# Patient Record
Sex: FEMALE | Race: WHITE | Employment: UNEMPLOYED | URBAN - METROPOLITAN AREA
[De-identification: names, ages, dates, MRNs, and addresses within clinical notes are randomized per-mention and may not be internally consistent; named-entity substitution may affect disease eponyms.]

---

## 2021-04-16 ENCOUNTER — OFFICE VISIT (OUTPATIENT)
Dept: OBGYN CLINIC | Age: 41
End: 2021-04-16
Payer: COMMERCIAL

## 2021-04-16 VITALS
WEIGHT: 229 LBS | BODY MASS INDEX: 38.15 KG/M2 | HEART RATE: 90 BPM | HEIGHT: 65 IN | SYSTOLIC BLOOD PRESSURE: 120 MMHG | DIASTOLIC BLOOD PRESSURE: 80 MMHG

## 2021-04-16 DIAGNOSIS — Z12.31 ENCOUNTER FOR MAMMOGRAM TO ESTABLISH BASELINE MAMMOGRAM: ICD-10-CM

## 2021-04-16 DIAGNOSIS — Z01.419 WOMEN'S ANNUAL ROUTINE GYNECOLOGICAL EXAMINATION: Primary | ICD-10-CM

## 2021-04-16 DIAGNOSIS — Z11.51 ENCOUNTER FOR SCREENING FOR HUMAN PAPILLOMAVIRUS (HPV): ICD-10-CM

## 2021-04-16 DIAGNOSIS — N93.8 DUB (DYSFUNCTIONAL UTERINE BLEEDING): ICD-10-CM

## 2021-04-16 PROCEDURE — G8417 CALC BMI ABV UP PARAM F/U: HCPCS | Performed by: OBSTETRICS & GYNECOLOGY

## 2021-04-16 PROCEDURE — G8427 DOCREV CUR MEDS BY ELIG CLIN: HCPCS | Performed by: OBSTETRICS & GYNECOLOGY

## 2021-04-16 PROCEDURE — 99386 PREV VISIT NEW AGE 40-64: CPT | Performed by: OBSTETRICS & GYNECOLOGY

## 2021-04-16 PROCEDURE — 4004F PT TOBACCO SCREEN RCVD TLK: CPT | Performed by: OBSTETRICS & GYNECOLOGY

## 2021-04-16 RX ORDER — OMEPRAZOLE 40 MG/1
CAPSULE, DELAYED RELEASE ORAL
COMMUNITY
Start: 2021-04-01

## 2021-04-16 NOTE — PROGRESS NOTES
Subjective:       Monroe is a 38 y.o. female  here for routine exam.  Current Complaints: no breast pain or new or enlarging lumps on self exam, no vaginal bleeding, no discharge or pelvic pain, no hot flashes, she complains of irregular bleeding for the last 6mos having monthly to twice monthly 7 day cycles. Menstrual history:   See HPI  Sexual activity:  yes, denies knowledge of risky exposure  Abnormalvaginal discharge:  No  Contraceptive method:  none    Vitals:  /80 (Site: Right Upper Arm, Position: Sitting, Cuff Size: Large Adult)   Pulse 90   Ht 5' 5\" (1.651 m)   Wt 229 lb (103.9 kg)   LMP 04/10/2021 (Exact Date)   BMI 38.11 kg/m²   Allergies:Tylenol [acetaminophen]  No past medical history on file. No past surgical history on file. No family history on file.   Social History     Socioeconomic History    Marital status:      Spouse name: Not on file    Number of children: Not on file    Years of education: Not on file    Highest education level: Not on file   Occupational History    Not on file   Social Needs    Financial resource strain: Not on file    Food insecurity     Worry: Not on file     Inability: Not on file    Transportation needs     Medical: Not on file     Non-medical: Not on file   Tobacco Use    Smoking status: Current Every Day Smoker     Packs/day: 1.00     Types: Cigarettes   Substance and Sexual Activity    Alcohol use: No    Drug use: No    Sexual activity: Yes   Lifestyle    Physical activity     Days per week: Not on file     Minutes per session: Not on file    Stress: Not on file   Relationships    Social connections     Talks on phone: Not on file     Gets together: Not on file     Attends [employer] service: Not on file     Active member of club or organization: Not on file     Attends meetings of clubs or organizations: Not on file     Relationship status: Not on file    Intimate partner violence     Fear of current or ex partner: Not Right upper body: No supraclavicular or axillary adenopathy. Left upper body: No supraclavicular or axillary adenopathy. Skin:     General: Skin is warm and dry. Neurological:      Mental Status: She is alert and oriented to person, place, and time. Cranial Nerves: No cranial nerve deficit. Deep Tendon Reflexes: Reflexes normal.   Psychiatric:         Behavior: Behavior normal.         Judgment: Judgment normal.         Assessment:      Diagnosis Orders   1. Women's annual routine gynecological examination  PAP SMEAR   2. Encounter for screening for human papillomavirus (HPV)  PAP SMEAR   3. Encounter for mammogram to establish baseline mammogram  PAP SMEAR    GINNY DIGITAL SCREEN W OR WO CAD BILATERAL   4. DUB (dysfunctional uterine bleeding)  US PELVIS COMPLETE    US NON OB TRANSVAGINAL       Body mass index is 38.11 kg/m². Obesity:  Overweight        Plan:   Pap smear : indicated:  performed. Breast exam :Normal  STD work up : As appropriate    Obesity Counseling:  Given  Smoking Counseling:  N/A  STD counseling: Will call pt with results    Orders Placed This Encounter   Procedures    GINNY DIGITAL SCREEN W OR WO CAD BILATERAL     Standing Status:   Future     Standing Expiration Date:   6/16/2022    US PELVIS COMPLETE     Standing Status:   Future     Standing Expiration Date:   4/16/2022    US NON OB TRANSVAGINAL     Standing Status:   Future     Standing Expiration Date:   4/16/2022    PAP SMEAR     Standing Status:   Future     Standing Expiration Date:   4/16/2022     Order Specific Question:   Collection Type     Answer: Thin Prep     Order Specific Question:   Prior Abnormal Pap Test     Answer:   No     Order Specific Question:   Screening or Diagnostic     Answer:   Screening     Order Specific Question:   HPV Requested?      Answer:   Yes     Comments:   16/18     Order Specific Question:   High Risk Patient     Answer:   N/A     No orders of the defined types were placed in this encounter. Follow up:  Return for follow up results.       [address] Levine, Susan. \[facility].\"", DO

## 2021-04-17 ASSESSMENT — ENCOUNTER SYMPTOMS
BLOOD IN STOOL: 0
ABDOMINAL DISTENTION: 0
ABDOMINAL PAIN: 0
SHORTNESS OF BREATH: 0
VOMITING: 0
NAUSEA: 0
DIARRHEA: 0
CONSTIPATION: 0
WHEEZING: 0
COUGH: 0
SORE THROAT: 0

## 2021-04-23 RX ORDER — METRONIDAZOLE 500 MG/1
500 TABLET ORAL 2 TIMES DAILY
Qty: 14 TABLET | Refills: 0 | Status: SHIPPED | OUTPATIENT
Start: 2021-04-23 | End: 2021-04-30

## 2021-04-28 ENCOUNTER — HOSPITAL ENCOUNTER (OUTPATIENT)
Dept: ULTRASOUND IMAGING | Age: 41
Discharge: HOME | End: 2021-04-30
Payer: COMMERCIAL

## 2021-04-28 ENCOUNTER — HOSPITAL ENCOUNTER (OUTPATIENT)
Dept: WOMENS IMAGING | Age: 41
Discharge: HOME | End: 2021-04-30
Payer: COMMERCIAL

## 2021-04-28 DIAGNOSIS — N93.8 DUB (DYSFUNCTIONAL UTERINE BLEEDING): ICD-10-CM

## 2021-04-28 DIAGNOSIS — Z12.31 ENCOUNTER FOR MAMMOGRAM TO ESTABLISH BASELINE MAMMOGRAM: ICD-10-CM

## 2021-04-28 PROCEDURE — 76856 US EXAM PELVIC COMPLETE: CPT

## 2021-04-28 PROCEDURE — 77063 BREAST TOMOSYNTHESIS BI: CPT

## 2021-04-28 PROCEDURE — 76830 TRANSVAGINAL US NON-OB: CPT

## 2021-05-05 ENCOUNTER — OFFICE VISIT (OUTPATIENT)
Dept: OBGYN CLINIC | Age: 41
End: 2021-05-05
Payer: COMMERCIAL

## 2021-05-05 VITALS
WEIGHT: 231 LBS | BODY MASS INDEX: 38.44 KG/M2 | DIASTOLIC BLOOD PRESSURE: 64 MMHG | SYSTOLIC BLOOD PRESSURE: 122 MMHG | HEART RATE: 92 BPM

## 2021-05-05 DIAGNOSIS — N93.8 DUB (DYSFUNCTIONAL UTERINE BLEEDING): ICD-10-CM

## 2021-05-05 DIAGNOSIS — N93.8 DUB (DYSFUNCTIONAL UTERINE BLEEDING): Primary | ICD-10-CM

## 2021-05-05 PROCEDURE — 58100 BIOPSY OF UTERUS LINING: CPT | Performed by: OBSTETRICS & GYNECOLOGY

## 2023-04-18 DIAGNOSIS — R10.11 RIGHT UPPER QUADRANT PAIN: ICD-10-CM

## 2023-04-18 RX ORDER — OMEPRAZOLE 40 MG/1
CAPSULE, DELAYED RELEASE ORAL
Qty: 90 CAPSULE | Refills: 0 | Status: SHIPPED | OUTPATIENT
Start: 2023-04-18 | End: 2023-06-26

## 2023-05-19 DIAGNOSIS — F32.A DEPRESSION, UNSPECIFIED: ICD-10-CM

## 2023-05-19 RX ORDER — SERTRALINE HYDROCHLORIDE 25 MG/1
TABLET, FILM COATED ORAL
Qty: 30 TABLET | Refills: 1 | Status: SHIPPED | OUTPATIENT
Start: 2023-05-19 | End: 2023-06-14

## 2023-06-12 DIAGNOSIS — E78.5 HYPERLIPIDEMIA, UNSPECIFIED: ICD-10-CM

## 2023-06-12 RX ORDER — ATORVASTATIN CALCIUM 10 MG/1
TABLET, FILM COATED ORAL
Qty: 90 TABLET | Refills: 0 | Status: SHIPPED | OUTPATIENT
Start: 2023-06-12 | End: 2023-07-24 | Stop reason: SDUPTHER

## 2023-06-14 DIAGNOSIS — F32.A DEPRESSION, UNSPECIFIED: ICD-10-CM

## 2023-06-14 RX ORDER — SERTRALINE HYDROCHLORIDE 25 MG/1
TABLET, FILM COATED ORAL
Qty: 30 TABLET | Refills: 1 | Status: SHIPPED | OUTPATIENT
Start: 2023-06-14 | End: 2023-07-24 | Stop reason: SDUPTHER

## 2023-07-24 ENCOUNTER — OFFICE VISIT (OUTPATIENT)
Dept: PRIMARY CARE | Facility: CLINIC | Age: 43
End: 2023-07-24
Payer: COMMERCIAL

## 2023-07-24 VITALS
BODY MASS INDEX: 34.46 KG/M2 | TEMPERATURE: 98.4 F | SYSTOLIC BLOOD PRESSURE: 104 MMHG | DIASTOLIC BLOOD PRESSURE: 70 MMHG | WEIGHT: 220 LBS

## 2023-07-24 DIAGNOSIS — Z12.31 SCREENING MAMMOGRAM FOR BREAST CANCER: ICD-10-CM

## 2023-07-24 DIAGNOSIS — E78.5 HYPERLIPIDEMIA, UNSPECIFIED HYPERLIPIDEMIA TYPE: ICD-10-CM

## 2023-07-24 DIAGNOSIS — E78.5 HYPERLIPIDEMIA, UNSPECIFIED: ICD-10-CM

## 2023-07-24 DIAGNOSIS — R10.11 RIGHT UPPER QUADRANT PAIN: ICD-10-CM

## 2023-07-24 DIAGNOSIS — F32.A DEPRESSION, UNSPECIFIED: ICD-10-CM

## 2023-07-24 DIAGNOSIS — K21.9 GASTROESOPHAGEAL REFLUX DISEASE, UNSPECIFIED WHETHER ESOPHAGITIS PRESENT: ICD-10-CM

## 2023-07-24 DIAGNOSIS — F33.42 RECURRENT MAJOR DEPRESSIVE DISORDER, IN FULL REMISSION (CMS-HCC): Primary | ICD-10-CM

## 2023-07-24 LAB
ALANINE AMINOTRANSFERASE (SGPT) (U/L) IN SER/PLAS: 9 U/L (ref 7–45)
ALBUMIN (G/DL) IN SER/PLAS: 4.1 G/DL (ref 3.4–5)
ALKALINE PHOSPHATASE (U/L) IN SER/PLAS: 100 U/L (ref 33–110)
ANION GAP IN SER/PLAS: 12 MMOL/L (ref 10–20)
ASPARTATE AMINOTRANSFERASE (SGOT) (U/L) IN SER/PLAS: 12 U/L (ref 9–39)
BASOPHILS (10*3/UL) IN BLOOD BY AUTOMATED COUNT: 0.03 X10E9/L (ref 0–0.1)
BASOPHILS/100 LEUKOCYTES IN BLOOD BY AUTOMATED COUNT: 0.4 % (ref 0–2)
BILIRUBIN TOTAL (MG/DL) IN SER/PLAS: 0.2 MG/DL (ref 0–1.2)
CALCIUM (MG/DL) IN SER/PLAS: 9.3 MG/DL (ref 8.6–10.3)
CARBON DIOXIDE, TOTAL (MMOL/L) IN SER/PLAS: 25 MMOL/L (ref 21–32)
CHLORIDE (MMOL/L) IN SER/PLAS: 103 MMOL/L (ref 98–107)
CHOLESTEROL (MG/DL) IN SER/PLAS: 166 MG/DL (ref 0–199)
CHOLESTEROL IN HDL (MG/DL) IN SER/PLAS: 46.1 MG/DL
CHOLESTEROL/HDL RATIO: 3.6
CREATININE (MG/DL) IN SER/PLAS: 0.75 MG/DL (ref 0.5–1.05)
EOSINOPHILS (10*3/UL) IN BLOOD BY AUTOMATED COUNT: 0.09 X10E9/L (ref 0–0.7)
EOSINOPHILS/100 LEUKOCYTES IN BLOOD BY AUTOMATED COUNT: 1.1 % (ref 0–6)
ERYTHROCYTE DISTRIBUTION WIDTH (RATIO) BY AUTOMATED COUNT: 14.4 % (ref 11.5–14.5)
ERYTHROCYTE MEAN CORPUSCULAR HEMOGLOBIN CONCENTRATION (G/DL) BY AUTOMATED: 31.5 G/DL (ref 32–36)
ERYTHROCYTE MEAN CORPUSCULAR VOLUME (FL) BY AUTOMATED COUNT: 89 FL (ref 80–100)
ERYTHROCYTES (10*6/UL) IN BLOOD BY AUTOMATED COUNT: 4.8 X10E12/L (ref 4–5.2)
GFR FEMALE: >90 ML/MIN/1.73M2
GLUCOSE (MG/DL) IN SER/PLAS: 101 MG/DL (ref 74–99)
HEMATOCRIT (%) IN BLOOD BY AUTOMATED COUNT: 42.9 % (ref 36–46)
HEMOGLOBIN (G/DL) IN BLOOD: 13.5 G/DL (ref 12–16)
IMMATURE GRANULOCYTES/100 LEUKOCYTES IN BLOOD BY AUTOMATED COUNT: 0.1 % (ref 0–0.9)
LDL: 87 MG/DL (ref 0–99)
LEUKOCYTES (10*3/UL) IN BLOOD BY AUTOMATED COUNT: 8.4 X10E9/L (ref 4.4–11.3)
LYMPHOCYTES (10*3/UL) IN BLOOD BY AUTOMATED COUNT: 2.58 X10E9/L (ref 1.2–4.8)
LYMPHOCYTES/100 LEUKOCYTES IN BLOOD BY AUTOMATED COUNT: 30.8 % (ref 13–44)
MONOCYTES (10*3/UL) IN BLOOD BY AUTOMATED COUNT: 0.59 X10E9/L (ref 0.1–1)
MONOCYTES/100 LEUKOCYTES IN BLOOD BY AUTOMATED COUNT: 7 % (ref 2–10)
NEUTROPHILS (10*3/UL) IN BLOOD BY AUTOMATED COUNT: 5.08 X10E9/L (ref 1.2–7.7)
NEUTROPHILS/100 LEUKOCYTES IN BLOOD BY AUTOMATED COUNT: 60.6 % (ref 40–80)
PLATELETS (10*3/UL) IN BLOOD AUTOMATED COUNT: 308 X10E9/L (ref 150–450)
POTASSIUM (MMOL/L) IN SER/PLAS: 4.5 MMOL/L (ref 3.5–5.3)
PROTEIN TOTAL: 7.1 G/DL (ref 6.4–8.2)
SODIUM (MMOL/L) IN SER/PLAS: 135 MMOL/L (ref 136–145)
THYROTROPIN (MIU/L) IN SER/PLAS BY DETECTION LIMIT <= 0.05 MIU/L: 3.14 MIU/L (ref 0.44–3.98)
TRIGLYCERIDE (MG/DL) IN SER/PLAS: 166 MG/DL (ref 0–149)
UREA NITROGEN (MG/DL) IN SER/PLAS: 11 MG/DL (ref 6–23)
VLDL: 33 MG/DL (ref 0–40)

## 2023-07-24 PROCEDURE — 99214 OFFICE O/P EST MOD 30 MIN: CPT | Performed by: FAMILY MEDICINE

## 2023-07-24 PROCEDURE — 85025 COMPLETE CBC W/AUTO DIFF WBC: CPT

## 2023-07-24 PROCEDURE — 84443 ASSAY THYROID STIM HORMONE: CPT

## 2023-07-24 PROCEDURE — 1036F TOBACCO NON-USER: CPT | Performed by: FAMILY MEDICINE

## 2023-07-24 PROCEDURE — 80061 LIPID PANEL: CPT

## 2023-07-24 PROCEDURE — 80053 COMPREHEN METABOLIC PANEL: CPT

## 2023-07-24 PROCEDURE — 83036 HEMOGLOBIN GLYCOSYLATED A1C: CPT

## 2023-07-24 RX ORDER — ATORVASTATIN CALCIUM 10 MG/1
10 TABLET, FILM COATED ORAL DAILY
Qty: 90 TABLET | Refills: 1 | Status: SHIPPED | OUTPATIENT
Start: 2023-07-24 | End: 2024-02-27

## 2023-07-24 RX ORDER — OMEPRAZOLE 40 MG/1
40 CAPSULE, DELAYED RELEASE ORAL DAILY
Qty: 90 CAPSULE | Refills: 1 | Status: SHIPPED | OUTPATIENT
Start: 2023-07-24 | End: 2024-01-22

## 2023-07-24 RX ORDER — SERTRALINE HYDROCHLORIDE 25 MG/1
25 TABLET, FILM COATED ORAL DAILY
Qty: 90 TABLET | Refills: 1 | Status: SHIPPED | OUTPATIENT
Start: 2023-07-24 | End: 2024-02-27

## 2023-07-24 ASSESSMENT — ENCOUNTER SYMPTOMS
COUGH: 0
HEADACHES: 0
HEMATURIA: 0
DYSPHORIC MOOD: 0
SLEEP DISTURBANCE: 0
FEVER: 0
FATIGUE: 0
DYSURIA: 0
PALPITATIONS: 0
ABDOMINAL PAIN: 0
BACK PAIN: 0
FREQUENCY: 0
RHINORRHEA: 0
NAUSEA: 0
ADENOPATHY: 0
VOICE CHANGE: 0
WEAKNESS: 0
ROS SKIN COMMENTS: NO MOLES GROWING OR CHANGING.
DEPRESSION: 0
NERVOUS/ANXIOUS: 0
VOMITING: 0
MYALGIAS: 0
SORE THROAT: 0
CONSTIPATION: 0
BLOOD IN STOOL: 0
DIZZINESS: 0
SHORTNESS OF BREATH: 0
WHEEZING: 0
DIARRHEA: 0
SINUS PRESSURE: 0
ARTHRALGIAS: 0
WOUND: 0
NUMBNESS: 0

## 2023-07-24 ASSESSMENT — PATIENT HEALTH QUESTIONNAIRE - PHQ9
2. FEELING DOWN, DEPRESSED OR HOPELESS: NOT AT ALL
SUM OF ALL RESPONSES TO PHQ9 QUESTIONS 1 AND 2: 0
1. LITTLE INTEREST OR PLEASURE IN DOING THINGS: NOT AT ALL

## 2023-07-24 NOTE — PATIENT INSTRUCTIONS
I will refill your medication and order labs.  I ordered a screening mammogram.  It is excellent that you stopped smoking in 2021.  Do not go back to smoking.  Weight loss would benefit your overall health.  Return in six months for a recheck.

## 2023-07-25 LAB
ESTIMATED AVERAGE GLUCOSE FOR HBA1C: 114 MG/DL
HEMOGLOBIN A1C/HEMOGLOBIN TOTAL IN BLOOD: 5.6 %

## 2023-07-27 ENCOUNTER — TELEPHONE (OUTPATIENT)
Dept: PRIMARY CARE | Facility: CLINIC | Age: 43
End: 2023-07-27
Payer: COMMERCIAL

## 2023-07-27 NOTE — TELEPHONE ENCOUNTER
----- Message from Rasheed Gtz DO sent at 7/26/2023  3:48 PM EDT -----  Regarding: FW:   Please let her know that her blood work showed no significant abnormalities.  ----- Message -----  From: Lab, Background User  Sent: 7/24/2023   5:43 PM EDT  To: Rasheed Gtz DO

## 2024-01-22 DIAGNOSIS — R10.11 RIGHT UPPER QUADRANT PAIN: ICD-10-CM

## 2024-01-22 RX ORDER — OMEPRAZOLE 40 MG/1
40 CAPSULE, DELAYED RELEASE ORAL DAILY
Qty: 90 CAPSULE | Refills: 1 | Status: SHIPPED | OUTPATIENT
Start: 2024-01-22

## 2024-02-27 DIAGNOSIS — F32.A DEPRESSION, UNSPECIFIED: ICD-10-CM

## 2024-02-27 DIAGNOSIS — E78.5 HYPERLIPIDEMIA, UNSPECIFIED: ICD-10-CM

## 2024-02-27 RX ORDER — SERTRALINE HYDROCHLORIDE 25 MG/1
25 TABLET, FILM COATED ORAL DAILY
Qty: 90 TABLET | Refills: 0 | Status: SHIPPED | OUTPATIENT
Start: 2024-02-27

## 2024-02-27 RX ORDER — ATORVASTATIN CALCIUM 10 MG/1
10 TABLET, FILM COATED ORAL DAILY
Qty: 90 TABLET | Refills: 0 | Status: SHIPPED | OUTPATIENT
Start: 2024-02-27

## 2024-07-03 DIAGNOSIS — F32.A DEPRESSION, UNSPECIFIED: ICD-10-CM

## 2024-07-03 DIAGNOSIS — E78.5 HYPERLIPIDEMIA, UNSPECIFIED: ICD-10-CM

## 2024-07-05 RX ORDER — SERTRALINE HYDROCHLORIDE 25 MG/1
25 TABLET, FILM COATED ORAL DAILY
Qty: 30 TABLET | Refills: 1 | Status: SHIPPED | OUTPATIENT
Start: 2024-07-05

## 2024-07-05 RX ORDER — ATORVASTATIN CALCIUM 10 MG/1
10 TABLET, FILM COATED ORAL DAILY
Qty: 90 TABLET | Refills: 0 | Status: SHIPPED | OUTPATIENT
Start: 2024-07-05

## 2024-08-29 ENCOUNTER — APPOINTMENT (OUTPATIENT)
Dept: PRIMARY CARE | Facility: CLINIC | Age: 44
End: 2024-08-29
Payer: COMMERCIAL

## 2024-08-29 VITALS
DIASTOLIC BLOOD PRESSURE: 66 MMHG | HEIGHT: 67 IN | SYSTOLIC BLOOD PRESSURE: 110 MMHG | TEMPERATURE: 97.6 F | WEIGHT: 227 LBS | BODY MASS INDEX: 35.63 KG/M2

## 2024-08-29 DIAGNOSIS — H66.001 ACUTE SUPPURATIVE OTITIS MEDIA OF RIGHT EAR WITHOUT SPONTANEOUS RUPTURE OF TYMPANIC MEMBRANE, RECURRENCE NOT SPECIFIED: ICD-10-CM

## 2024-08-29 DIAGNOSIS — Z00.00 GENERAL MEDICAL EXAM: Primary | ICD-10-CM

## 2024-08-29 DIAGNOSIS — E78.5 HYPERLIPIDEMIA, UNSPECIFIED HYPERLIPIDEMIA TYPE: ICD-10-CM

## 2024-08-29 DIAGNOSIS — Z12.31 SCREENING MAMMOGRAM FOR BREAST CANCER: ICD-10-CM

## 2024-08-29 DIAGNOSIS — Z23 NEED FOR VACCINATION: ICD-10-CM

## 2024-08-29 PROCEDURE — 90471 IMMUNIZATION ADMIN: CPT | Performed by: FAMILY MEDICINE

## 2024-08-29 PROCEDURE — 3008F BODY MASS INDEX DOCD: CPT | Performed by: FAMILY MEDICINE

## 2024-08-29 PROCEDURE — 90656 IIV3 VACC NO PRSV 0.5 ML IM: CPT | Performed by: FAMILY MEDICINE

## 2024-08-29 PROCEDURE — 99396 PREV VISIT EST AGE 40-64: CPT | Performed by: FAMILY MEDICINE

## 2024-08-29 PROCEDURE — 1036F TOBACCO NON-USER: CPT | Performed by: FAMILY MEDICINE

## 2024-08-29 RX ORDER — AMOXICILLIN AND CLAVULANATE POTASSIUM 875; 125 MG/1; MG/1
875 TABLET, FILM COATED ORAL 2 TIMES DAILY
Qty: 20 TABLET | Refills: 0 | Status: SHIPPED | OUTPATIENT
Start: 2024-08-29 | End: 2024-09-08

## 2024-08-29 RX ORDER — FLUTICASONE PROPIONATE 50 MCG
1 SPRAY, SUSPENSION (ML) NASAL DAILY
Qty: 16 G | Refills: 0 | Status: SHIPPED | OUTPATIENT
Start: 2024-08-29 | End: 2025-08-29

## 2024-08-29 ASSESSMENT — ENCOUNTER SYMPTOMS
HEMATURIA: 0
DIARRHEA: 0
DIZZINESS: 0
FEVER: 0
WEAKNESS: 0
ABDOMINAL PAIN: 0
BLOOD IN STOOL: 0
WOUND: 0
SORE THROAT: 0
COUGH: 0
VOICE CHANGE: 0
FATIGUE: 0
ARTHRALGIAS: 0
VOMITING: 0
SINUS PRESSURE: 0
SLEEP DISTURBANCE: 0
HEADACHES: 0
NUMBNESS: 0
FREQUENCY: 0
NAUSEA: 0
ROS SKIN COMMENTS: NO MOLES GROWING OR CHANGING.
CONSTIPATION: 0
BACK PAIN: 0
DYSURIA: 0
RHINORRHEA: 0
ADENOPATHY: 0
TROUBLE SWALLOWING: 0
DYSPHORIC MOOD: 0
PALPITATIONS: 0
SHORTNESS OF BREATH: 0
MYALGIAS: 0
NERVOUS/ANXIOUS: 1
WHEEZING: 0

## 2024-08-29 ASSESSMENT — PROMIS GLOBAL HEALTH SCALE
EMOTIONAL_PROBLEMS: SOMETIMES
RATE_AVERAGE_FATIGUE: MILD
RATE_GENERAL_HEALTH: VERY GOOD
RATE_QUALITY_OF_LIFE: VERY GOOD
CARRYOUT_SOCIAL_ACTIVITIES: VERY GOOD
RATE_AVERAGE_PAIN: 0
RATE_SOCIAL_SATISFACTION: VERY GOOD
RATE_PHYSICAL_HEALTH: FAIR
CARRYOUT_PHYSICAL_ACTIVITIES: COMPLETELY
RATE_MENTAL_HEALTH: VERY GOOD

## 2024-08-29 ASSESSMENT — PATIENT HEALTH QUESTIONNAIRE - PHQ9
SUM OF ALL RESPONSES TO PHQ9 QUESTIONS 1 AND 2: 0
1. LITTLE INTEREST OR PLEASURE IN DOING THINGS: NOT AT ALL
2. FEELING DOWN, DEPRESSED OR HOPELESS: NOT AT ALL

## 2024-08-29 NOTE — PROGRESS NOTES
Subjective   Patient ID: [id number]     Tracy Iglesias is a 43 y.o. female who presents for Annual Exam (Has had coffee this morning.), Form Completion, and Flu Vaccine.  HPI  She is here for a general medical examination.     She requests a flu shot.     She remains on sertraline, atorvastatin and omeprazole.      Anxiety is well controlled on sertraline.       Current Outpatient Medications on File Prior to Visit   Medication Sig Dispense Refill    atorvastatin (Lipitor) 10 mg tablet TAKE ONE TABLET BY MOUTH EVERY DAY 90 tablet 0    omeprazole (PriLOSEC) 40 mg DR capsule TAKE ONE CAPSULE BY MOUTH EVERY DAY 90 capsule 1    sertraline (Zoloft) 25 mg tablet TAKE ONE TABLET BY MOUTH EVERY DAY 30 tablet 1     No current facility-administered medications on file prior to visit.     Tobacco Use: Medium Risk (2024)    Patient History     Smoking Tobacco Use: Former     Smokeless Tobacco Use: Never     Passive Exposure: Not on file   She quit smoking in .  She is doing ok with that.  No alcohol or drug use.     She tries to exercise. She tries to maintain a healthy diet.  She states she eats the least in her house but she weighs the most.       Family History   Problem Relation Name Age of Onset    Cancer Mother Matilde     Cancer Father Isidro     Heart attack Brother     Mom had non-Hodgkins lymphoma--rare.  Age 63.  Dad had lung cancer at 55.  Brother  from a heart attack at age 44.    Review of Systems   Constitutional:  Negative for fatigue and fever.   HENT:  Negative for rhinorrhea, sinus pressure, sore throat, trouble swallowing and voice change.         Right ear plugged for two weeks.   Respiratory:  Negative for cough, shortness of breath and wheezing.    Cardiovascular:  Negative for chest pain, palpitations and leg swelling.   Gastrointestinal:  Negative for abdominal pain, blood in stool, constipation, diarrhea, nausea and vomiting.   Genitourinary:  Negative for dysuria, frequency, hematuria and  "vaginal bleeding.   Musculoskeletal:  Negative for arthralgias, back pain and myalgias.   Skin:  Negative for rash and wound.        No moles growing or changing.   Neurological:  Negative for dizziness, syncope, weakness, numbness and headaches.   Hematological:  Negative for adenopathy.   Psychiatric/Behavioral:  Negative for dysphoric mood, self-injury, sleep disturbance and suicidal ideas. The patient is nervous/anxious (controlled on sertraline.).        Objective     /66 (BP Location: Right arm, Patient Position: Sitting)   Temp 36.4 °C (97.6 °F) (Skin)   Ht 1.702 m (5' 7\")   Wt 103 kg (227 lb)   BMI 35.55 kg/m²      Physical Exam  Vitals reviewed.   Constitutional:       General: She is not in acute distress.     Appearance: Normal appearance. She is not ill-appearing or toxic-appearing.   HENT:      Head: Normocephalic and atraumatic.      Left Ear: Tympanic membrane, ear canal and external ear normal.      Ears:      Comments: Right TM bulging. Fluid filled.  Left TM normal     Nose: Nose normal.      Mouth/Throat:      Mouth: Mucous membranes are moist.   Eyes:      Extraocular Movements: Extraocular movements intact.      Conjunctiva/sclera: Conjunctivae normal.      Pupils: Pupils are equal, round, and reactive to light.   Cardiovascular:      Rate and Rhythm: Normal rate and regular rhythm.      Heart sounds: No murmur heard.  Pulmonary:      Effort: Pulmonary effort is normal.      Breath sounds: Normal breath sounds.   Abdominal:      General: Bowel sounds are normal. There is no distension.      Palpations: Abdomen is soft. There is no mass.      Tenderness: There is no abdominal tenderness. There is no guarding or rebound.   Musculoskeletal:         General: No tenderness.      Cervical back: Neck supple.      Right lower leg: No edema.      Left lower leg: No edema.   Skin:     Coloration: Skin is not jaundiced or pale.      Findings: No rash.   Neurological:      General: No focal " deficit present.      Mental Status: She is alert and oriented to person, place, and time. Mental status is at baseline.   Psychiatric:         Mood and Affect: Mood normal.         Behavior: Behavior normal.         Thought Content: Thought content normal.         Judgment: Judgment normal.         Assessment/Plan   Problem List Items Addressed This Visit    None  Visit Diagnoses       General medical exam    -  Primary    Relevant Orders    Urinalysis with Reflex Microscopic    Thyroid Stimulating Hormone    Hemoglobin A1C    Lipid Panel    Comprehensive Metabolic Panel    CBC and Auto Differential    CT cardiac scoring wo IV contrast    Screening mammogram for breast cancer        Relevant Orders    BI mammo bilateral screening tomosynthesis    Hyperlipidemia, unspecified hyperlipidemia type        Relevant Orders    CT cardiac scoring wo IV contrast    Need for vaccination        Acute suppurative otitis media of right ear without spontaneous rupture of tympanic membrane, recurrence not specified        Relevant Medications    amoxicillin-pot clavulanate (Augmentin) 875-125 mg tablet    fluticasone (Flonase) 50 mcg/actuation nasal spray        I will order antibiotics and a nasal steroid for your ear infection.  Return in one month if the pressure persists.  I will order labs to be done fasting.  A mammogram was ordered today.  I ordered a CT cardiac scoring test since your brother passed from a heart attack at age 44.  Weight loss is recommended.  Try to decrease sugars and starches from your diet.  A flu shot was given today.    Rasheed Gtz, DO

## 2024-08-29 NOTE — PATIENT INSTRUCTIONS
I will order antibiotics and a nasal steroid for your ear infection.  Return in one month if the pressure persists.  I will order labs to be done fasting.  A mammogram was ordered today.  I ordered a CT cardiac scoring test since your brother passed from a heart attack at age 44.  Weight loss is recommended.  Try to decrease sugars and starches from your diet.  A flu shot was given today.

## 2024-09-04 ENCOUNTER — APPOINTMENT (OUTPATIENT)
Dept: RADIOLOGY | Facility: HOSPITAL | Age: 44
End: 2024-09-04
Payer: COMMERCIAL

## 2024-09-06 DIAGNOSIS — F32.A DEPRESSION, UNSPECIFIED: ICD-10-CM

## 2024-09-06 RX ORDER — SERTRALINE HYDROCHLORIDE 25 MG/1
25 TABLET, FILM COATED ORAL DAILY
Qty: 30 TABLET | Refills: 1 | Status: SHIPPED | OUTPATIENT
Start: 2024-09-06

## 2024-09-10 ENCOUNTER — APPOINTMENT (OUTPATIENT)
Dept: RADIOLOGY | Facility: HOSPITAL | Age: 44
End: 2024-09-10
Payer: COMMERCIAL

## 2024-09-13 ENCOUNTER — OFFICE VISIT (OUTPATIENT)
Dept: URGENT CARE | Age: 44
End: 2024-09-13
Payer: COMMERCIAL

## 2024-09-13 VITALS
HEIGHT: 68 IN | BODY MASS INDEX: 34.4 KG/M2 | RESPIRATION RATE: 16 BRPM | DIASTOLIC BLOOD PRESSURE: 81 MMHG | WEIGHT: 227 LBS | HEART RATE: 81 BPM | OXYGEN SATURATION: 94 % | TEMPERATURE: 98.2 F | SYSTOLIC BLOOD PRESSURE: 117 MMHG

## 2024-09-13 DIAGNOSIS — H60.501 ACUTE OTITIS EXTERNA OF RIGHT EAR, UNSPECIFIED TYPE: Primary | ICD-10-CM

## 2024-09-13 RX ORDER — OFLOXACIN 3 MG/ML
4 SOLUTION AURICULAR (OTIC) 2 TIMES DAILY
Qty: 55 ML | Refills: 0 | Status: SHIPPED | OUTPATIENT
Start: 2024-09-13 | End: 2024-09-20

## 2024-09-13 ASSESSMENT — ENCOUNTER SYMPTOMS
CONSTITUTIONAL NEGATIVE: 1
ENDOCRINE NEGATIVE: 1
PSYCHIATRIC NEGATIVE: 1
HEMATOLOGIC/LYMPHATIC NEGATIVE: 1
GASTROINTESTINAL NEGATIVE: 1
PALPITATIONS: 0
MUSCULOSKELETAL NEGATIVE: 1
RESPIRATORY NEGATIVE: 1
ALLERGIC/IMMUNOLOGIC NEGATIVE: 1
NEUROLOGICAL NEGATIVE: 1
EYES NEGATIVE: 1

## 2024-09-13 NOTE — PROGRESS NOTES
Subjective   Patient ID: Tracy Iglesias is a 44 y.o. female. They present today with a chief complaint of Earache (Seen by PCP for infection in rt ear on 8/29. Given Augmentin with no relief. Now starting to drain).    History of Present Illness    Earache   Associated symptoms include ear discharge and hearing loss.       Pt presents to urgent care with c/o  R ear pain x 2 weeks. Pt states she was seen by PCP on 8/29 for ear infection and was given a course of Augmentin which she completed.  States she is still having R ear pain, muffled hearing.  States this morning she noticed thick, white drainage from R ear.  Pt denies CP, SOB, palpitations, fevers, abd pain, n/v/d, sick contacts, recent travel.        Past Medical History  Allergies as of 09/13/2024 - Reviewed 09/13/2024   Allergen Reaction Noted    Acetaminophen Hives 01/01/2001       (Not in a hospital admission)       Past Medical History:   Diagnosis Date    Encounter for full-term uncomplicated delivery ([facility])     Normal vaginal delivery       Past Surgical History:   Procedure Laterality Date    WISDOM TOOTH EXTRACTION          reports that she quit smoking about 3 years ago. Her smoking use included cigarettes. She started smoking about 18 years ago. She has a 15 pack-year smoking history. She has never used smokeless tobacco. She reports that she does not drink alcohol and does not use drugs.    Review of Systems  Review of Systems   Constitutional: Negative.    HENT:  Positive for ear discharge, ear pain and hearing loss.    Eyes: Negative.    Respiratory: Negative.     Cardiovascular:  Negative for chest pain and palpitations.   Gastrointestinal: Negative.    Endocrine: Negative.    Genitourinary: Negative.    Musculoskeletal: Negative.    Skin: Negative.    Allergic/Immunologic: Negative.    Neurological: Negative.    Hematological: Negative.    Psychiatric/Behavioral: Negative.     All other systems reviewed and are negative.                  "                Objective    Vitals:    09/13/24 1809   BP: 117/81   BP Location: Left arm   Patient Position: Sitting   BP Cuff Size: Adult   Pulse: 81   Resp: 16   Temp: 36.8 °C (98.2 °F)   TempSrc: Oral   SpO2: 94%   Weight: 103 kg (227 lb)   Height: 1.727 m (5' 8\")     Patient's last menstrual period was 09/13/2024 (approximate).    Physical Exam  Vitals and nursing note reviewed.   Constitutional:       General: She is not in acute distress.     Appearance: Normal appearance. She is not ill-appearing or toxic-appearing.   HENT:      Head: Atraumatic.      Right Ear: Decreased hearing noted. Drainage and tenderness present. There is no impacted cerumen. Tympanic membrane is perforated.      Left Ear: Tympanic membrane, ear canal and external ear normal.      Nose: Nose normal.      Mouth/Throat:      Mouth: Mucous membranes are moist.      Pharynx: Oropharynx is clear. No oropharyngeal exudate or posterior oropharyngeal erythema.   Eyes:      Extraocular Movements: Extraocular movements intact.      Conjunctiva/sclera: Conjunctivae normal.      Pupils: Pupils are equal, round, and reactive to light.   Cardiovascular:      Rate and Rhythm: Normal rate and regular rhythm.      Pulses: Normal pulses.      Heart sounds: Normal heart sounds.   Pulmonary:      Effort: Pulmonary effort is normal.      Breath sounds: Normal breath sounds.   Abdominal:      General: Abdomen is flat. Bowel sounds are normal.      Palpations: Abdomen is soft.      Tenderness: There is no abdominal tenderness.   Musculoskeletal:         General: Normal range of motion.      Cervical back: Normal range of motion and neck supple. No tenderness.   Skin:     General: Skin is warm and dry.      Capillary Refill: Capillary refill takes less than 2 seconds.   Neurological:      General: No focal deficit present.      Mental Status: She is alert and oriented to person, place, and time.   Psychiatric:         Mood and Affect: Mood normal.         " Behavior: Behavior normal.         Thought Content: Thought content normal.         Procedures    Point of Care Test & Imaging Results from this visit  No results found for this visit on 09/13/24.   No results found.    Diagnostic study results (if any) were reviewed by LAYTON Marrero.    Assessment/Plan   Allergies, medications, history, and pertinent labs/EKGs/Imaging reviewed by LAYTON Marrero.     Medical Decision Making  MDM:  Patient presents to  with c/o R ear pain, decreased hearing, drainage.  On exam, pt with + drainage, tenderness to palpation to outer ear, small perforation noted to TM.  Suspect otitis externa as cause of pt's symptoms.  Given ruptured TM, will start pt on antibiotic ear drops without steroids.  Pt also given referral to ENT for follow up.  Pt reports she has been using otc motrin for discomfort which has been helping.  Advised pt to continue .    At time of discharge patient was clinically well-appearing and HDS for outpatient management. The patient and/or family was educated regarding diagnosis, supportive care, OTC and Rx medications. The patient and/or family was given the opportunity to ask questions prior to discharge.  They verbalized understanding of my discussion of the plans for treatment, expected course, indications to return to  or seek further evaluation in ED, and the need for timely follow up as directed.   They were provided with a work/school excuse if requested.       Orders and Diagnoses  Diagnoses and all orders for this visit:  Acute otitis externa of right ear, unspecified type  -     ofloxacin (Floxin) 0.3 % otic solution; Administer 4 drops into the right ear 2 times a day for 7 days.  -     Referral to ENT; Future      Medical Admin Record      Follow Up Instructions  No follow-ups on file.    Patient disposition: Home    Electronically signed by LAYTON Marrero  6:42 PM

## 2024-09-26 ENCOUNTER — APPOINTMENT (OUTPATIENT)
Dept: RADIOLOGY | Facility: HOSPITAL | Age: 44
End: 2024-09-26
Payer: COMMERCIAL

## 2024-10-09 ENCOUNTER — APPOINTMENT (OUTPATIENT)
Dept: RADIOLOGY | Facility: HOSPITAL | Age: 44
End: 2024-10-09
Payer: COMMERCIAL

## 2024-10-11 DIAGNOSIS — E78.5 HYPERLIPIDEMIA, UNSPECIFIED: ICD-10-CM

## 2024-10-14 RX ORDER — ATORVASTATIN CALCIUM 10 MG/1
10 TABLET, FILM COATED ORAL DAILY
Qty: 90 TABLET | Refills: 0 | Status: SHIPPED | OUTPATIENT
Start: 2024-10-14

## 2024-10-16 ENCOUNTER — HOSPITAL ENCOUNTER (OUTPATIENT)
Dept: RADIOLOGY | Facility: HOSPITAL | Age: 44
Discharge: HOME | End: 2024-10-16
Payer: COMMERCIAL

## 2024-10-16 ENCOUNTER — ANCILLARY PROCEDURE (OUTPATIENT)
Facility: HOSPITAL | Age: 44
End: 2024-10-16
Payer: COMMERCIAL

## 2024-10-16 DIAGNOSIS — Z12.31 SCREENING MAMMOGRAM FOR BREAST CANCER: ICD-10-CM

## 2024-10-16 DIAGNOSIS — Z00.00 GENERAL MEDICAL EXAM: ICD-10-CM

## 2024-10-16 DIAGNOSIS — E78.5 HYPERLIPIDEMIA, UNSPECIFIED HYPERLIPIDEMIA TYPE: ICD-10-CM

## 2024-10-16 PROCEDURE — 77067 SCR MAMMO BI INCL CAD: CPT

## 2024-10-16 PROCEDURE — 75571 CT HRT W/O DYE W/CA TEST: CPT

## 2024-10-22 ENCOUNTER — LAB (OUTPATIENT)
Dept: LAB | Facility: LAB | Age: 44
End: 2024-10-22
Payer: COMMERCIAL

## 2024-10-22 DIAGNOSIS — Z00.00 GENERAL MEDICAL EXAM: ICD-10-CM

## 2024-10-22 LAB
ALBUMIN SERPL BCP-MCNC: 3.8 G/DL (ref 3.4–5)
ALP SERPL-CCNC: 94 U/L (ref 33–110)
ALT SERPL W P-5'-P-CCNC: 15 U/L (ref 7–45)
ANION GAP SERPL CALC-SCNC: 10 MMOL/L (ref 10–20)
APPEARANCE UR: CLEAR
AST SERPL W P-5'-P-CCNC: 19 U/L (ref 9–39)
BASOPHILS # BLD AUTO: 0.04 X10*3/UL (ref 0–0.1)
BASOPHILS NFR BLD AUTO: 0.5 %
BILIRUB SERPL-MCNC: 0.3 MG/DL (ref 0–1.2)
BILIRUB UR STRIP.AUTO-MCNC: NEGATIVE MG/DL
BUN SERPL-MCNC: 12 MG/DL (ref 6–23)
CALCIUM SERPL-MCNC: 9.1 MG/DL (ref 8.6–10.3)
CHLORIDE SERPL-SCNC: 104 MMOL/L (ref 98–107)
CHOLEST SERPL-MCNC: 173 MG/DL (ref 0–199)
CHOLESTEROL/HDL RATIO: 3.7
CO2 SERPL-SCNC: 28 MMOL/L (ref 21–32)
COLOR UR: COLORLESS
CREAT SERPL-MCNC: 0.74 MG/DL (ref 0.5–1.05)
EGFRCR SERPLBLD CKD-EPI 2021: >90 ML/MIN/1.73M*2
EOSINOPHIL # BLD AUTO: 0.13 X10*3/UL (ref 0–0.7)
EOSINOPHIL NFR BLD AUTO: 1.5 %
ERYTHROCYTE [DISTWIDTH] IN BLOOD BY AUTOMATED COUNT: 13.7 % (ref 11.5–14.5)
EST. AVERAGE GLUCOSE BLD GHB EST-MCNC: 117 MG/DL
GLUCOSE SERPL-MCNC: 94 MG/DL (ref 74–99)
GLUCOSE UR STRIP.AUTO-MCNC: NORMAL MG/DL
HBA1C MFR BLD: 5.7 %
HCT VFR BLD AUTO: 41.9 % (ref 36–46)
HDLC SERPL-MCNC: 47.1 MG/DL
HGB BLD-MCNC: 13.2 G/DL (ref 12–16)
IMM GRANULOCYTES # BLD AUTO: 0.02 X10*3/UL (ref 0–0.7)
IMM GRANULOCYTES NFR BLD AUTO: 0.2 % (ref 0–0.9)
KETONES UR STRIP.AUTO-MCNC: NEGATIVE MG/DL
LDLC SERPL CALC-MCNC: 85 MG/DL
LEUKOCYTE ESTERASE UR QL STRIP.AUTO: NEGATIVE
LYMPHOCYTES # BLD AUTO: 3.69 X10*3/UL (ref 1.2–4.8)
LYMPHOCYTES NFR BLD AUTO: 43 %
MCH RBC QN AUTO: 29.1 PG (ref 26–34)
MCHC RBC AUTO-ENTMCNC: 31.5 G/DL (ref 32–36)
MCV RBC AUTO: 93 FL (ref 80–100)
MONOCYTES # BLD AUTO: 0.61 X10*3/UL (ref 0.1–1)
MONOCYTES NFR BLD AUTO: 7.1 %
NEUTROPHILS # BLD AUTO: 4.09 X10*3/UL (ref 1.2–7.7)
NEUTROPHILS NFR BLD AUTO: 47.7 %
NITRITE UR QL STRIP.AUTO: NEGATIVE
NON HDL CHOLESTEROL: 126 MG/DL (ref 0–149)
NRBC BLD-RTO: 0 /100 WBCS (ref 0–0)
PH UR STRIP.AUTO: 5 [PH]
PLATELET # BLD AUTO: 324 X10*3/UL (ref 150–450)
POTASSIUM SERPL-SCNC: 4.7 MMOL/L (ref 3.5–5.3)
PROT SERPL-MCNC: 6.7 G/DL (ref 6.4–8.2)
PROT UR STRIP.AUTO-MCNC: NEGATIVE MG/DL
RBC # BLD AUTO: 4.53 X10*6/UL (ref 4–5.2)
RBC # UR STRIP.AUTO: NEGATIVE /UL
SODIUM SERPL-SCNC: 137 MMOL/L (ref 136–145)
SP GR UR STRIP.AUTO: 1
TRIGL SERPL-MCNC: 207 MG/DL (ref 0–149)
TSH SERPL-ACNC: 3.36 MIU/L (ref 0.44–3.98)
UROBILINOGEN UR STRIP.AUTO-MCNC: NORMAL MG/DL
VLDL: 41 MG/DL (ref 0–40)
WBC # BLD AUTO: 8.6 X10*3/UL (ref 4.4–11.3)

## 2024-10-22 PROCEDURE — 80053 COMPREHEN METABOLIC PANEL: CPT

## 2024-10-22 PROCEDURE — 83036 HEMOGLOBIN GLYCOSYLATED A1C: CPT

## 2024-10-22 PROCEDURE — 85025 COMPLETE CBC W/AUTO DIFF WBC: CPT

## 2024-10-22 PROCEDURE — 84443 ASSAY THYROID STIM HORMONE: CPT

## 2024-10-22 PROCEDURE — 80061 LIPID PANEL: CPT

## 2024-10-22 PROCEDURE — 81003 URINALYSIS AUTO W/O SCOPE: CPT

## 2024-10-22 PROCEDURE — 36415 COLL VENOUS BLD VENIPUNCTURE: CPT

## 2024-10-24 ENCOUNTER — HOSPITAL ENCOUNTER (OUTPATIENT)
Dept: RADIOLOGY | Facility: EXTERNAL LOCATION | Age: 44
Discharge: HOME | End: 2024-10-24

## 2024-10-25 DIAGNOSIS — R10.11 RIGHT UPPER QUADRANT PAIN: ICD-10-CM

## 2024-10-25 RX ORDER — OMEPRAZOLE 40 MG/1
40 CAPSULE, DELAYED RELEASE ORAL DAILY
Qty: 90 CAPSULE | Refills: 1 | Status: SHIPPED | OUTPATIENT
Start: 2024-10-25

## 2024-11-14 DIAGNOSIS — F32.A DEPRESSION, UNSPECIFIED: ICD-10-CM

## 2024-11-14 RX ORDER — SERTRALINE HYDROCHLORIDE 25 MG/1
25 TABLET, FILM COATED ORAL DAILY
Qty: 30 TABLET | Refills: 1 | Status: SHIPPED | OUTPATIENT
Start: 2024-11-14

## 2025-01-13 DIAGNOSIS — E78.5 HYPERLIPIDEMIA, UNSPECIFIED: ICD-10-CM

## 2025-01-13 DIAGNOSIS — F32.A DEPRESSION, UNSPECIFIED: ICD-10-CM

## 2025-01-13 RX ORDER — SERTRALINE HYDROCHLORIDE 25 MG/1
25 TABLET, FILM COATED ORAL DAILY
Qty: 30 TABLET | Refills: 1 | Status: SHIPPED | OUTPATIENT
Start: 2025-01-13

## 2025-01-13 RX ORDER — ATORVASTATIN CALCIUM 10 MG/1
10 TABLET, FILM COATED ORAL DAILY
Qty: 90 TABLET | Refills: 0 | Status: SHIPPED | OUTPATIENT
Start: 2025-01-13

## 2025-04-01 ENCOUNTER — APPOINTMENT (OUTPATIENT)
Dept: CARDIOLOGY | Facility: HOSPITAL | Age: 45
End: 2025-04-01
Payer: COMMERCIAL

## 2025-04-01 ENCOUNTER — APPOINTMENT (OUTPATIENT)
Dept: RADIOLOGY | Facility: HOSPITAL | Age: 45
End: 2025-04-01
Payer: COMMERCIAL

## 2025-04-01 ENCOUNTER — HOSPITAL ENCOUNTER (EMERGENCY)
Facility: HOSPITAL | Age: 45
Discharge: HOME | End: 2025-04-01
Attending: EMERGENCY MEDICINE
Payer: COMMERCIAL

## 2025-04-01 VITALS
TEMPERATURE: 97.5 F | BODY MASS INDEX: 36.37 KG/M2 | SYSTOLIC BLOOD PRESSURE: 140 MMHG | HEART RATE: 78 BPM | HEIGHT: 68 IN | RESPIRATION RATE: 18 BRPM | OXYGEN SATURATION: 95 % | DIASTOLIC BLOOD PRESSURE: 80 MMHG | WEIGHT: 240 LBS

## 2025-04-01 DIAGNOSIS — R10.11 RIGHT UPPER QUADRANT ABDOMINAL PAIN: Primary | ICD-10-CM

## 2025-04-01 DIAGNOSIS — R11.0 NAUSEA: ICD-10-CM

## 2025-04-01 LAB
ALBUMIN SERPL BCP-MCNC: 3.9 G/DL (ref 3.4–5)
ALP SERPL-CCNC: 80 U/L (ref 33–110)
ALT SERPL W P-5'-P-CCNC: 14 U/L (ref 7–45)
ANION GAP SERPL CALC-SCNC: 12 MMOL/L (ref 10–20)
APPEARANCE UR: CLEAR
AST SERPL W P-5'-P-CCNC: 15 U/L (ref 9–39)
BASOPHILS # BLD AUTO: 0.03 X10*3/UL (ref 0–0.1)
BASOPHILS NFR BLD AUTO: 0.3 %
BILIRUB SERPL-MCNC: 0.3 MG/DL (ref 0–1.2)
BILIRUB UR STRIP.AUTO-MCNC: NEGATIVE MG/DL
BUN SERPL-MCNC: 9 MG/DL (ref 6–23)
CALCIUM SERPL-MCNC: 8.7 MG/DL (ref 8.6–10.3)
CARDIAC TROPONIN I PNL SERPL HS: <3 NG/L (ref 0–13)
CHLORIDE SERPL-SCNC: 104 MMOL/L (ref 98–107)
CO2 SERPL-SCNC: 25 MMOL/L (ref 21–32)
COLOR UR: COLORLESS
CREAT SERPL-MCNC: 0.57 MG/DL (ref 0.5–1.05)
EGFRCR SERPLBLD CKD-EPI 2021: >90 ML/MIN/1.73M*2
EOSINOPHIL # BLD AUTO: 0.06 X10*3/UL (ref 0–0.7)
EOSINOPHIL NFR BLD AUTO: 0.6 %
ERYTHROCYTE [DISTWIDTH] IN BLOOD BY AUTOMATED COUNT: 13.2 % (ref 11.5–14.5)
GLUCOSE SERPL-MCNC: 105 MG/DL (ref 74–99)
GLUCOSE UR STRIP.AUTO-MCNC: NORMAL MG/DL
HCG UR QL IA.RAPID: NEGATIVE
HCT VFR BLD AUTO: 42.7 % (ref 36–46)
HGB BLD-MCNC: 13.8 G/DL (ref 12–16)
HOLD SPECIMEN: NORMAL
IMM GRANULOCYTES # BLD AUTO: 0.02 X10*3/UL (ref 0–0.7)
IMM GRANULOCYTES NFR BLD AUTO: 0.2 % (ref 0–0.9)
KETONES UR STRIP.AUTO-MCNC: NEGATIVE MG/DL
LEUKOCYTE ESTERASE UR QL STRIP.AUTO: NEGATIVE
LIPASE SERPL-CCNC: 81 U/L (ref 9–82)
LYMPHOCYTES # BLD AUTO: 1.98 X10*3/UL (ref 1.2–4.8)
LYMPHOCYTES NFR BLD AUTO: 20.8 %
MCH RBC QN AUTO: 29.6 PG (ref 26–34)
MCHC RBC AUTO-ENTMCNC: 32.3 G/DL (ref 32–36)
MCV RBC AUTO: 92 FL (ref 80–100)
MONOCYTES # BLD AUTO: 0.58 X10*3/UL (ref 0.1–1)
MONOCYTES NFR BLD AUTO: 6.1 %
NEUTROPHILS # BLD AUTO: 6.85 X10*3/UL (ref 1.2–7.7)
NEUTROPHILS NFR BLD AUTO: 72 %
NITRITE UR QL STRIP.AUTO: NEGATIVE
NRBC BLD-RTO: 0 /100 WBCS (ref 0–0)
PH UR STRIP.AUTO: 5.5 [PH]
PLATELET # BLD AUTO: 290 X10*3/UL (ref 150–450)
POTASSIUM SERPL-SCNC: 4 MMOL/L (ref 3.5–5.3)
PROT SERPL-MCNC: 7 G/DL (ref 6.4–8.2)
PROT UR STRIP.AUTO-MCNC: NEGATIVE MG/DL
RBC # BLD AUTO: 4.66 X10*6/UL (ref 4–5.2)
RBC # UR STRIP.AUTO: NEGATIVE MG/DL
SODIUM SERPL-SCNC: 137 MMOL/L (ref 136–145)
SP GR UR STRIP.AUTO: 1
UROBILINOGEN UR STRIP.AUTO-MCNC: NORMAL MG/DL
WBC # BLD AUTO: 9.5 X10*3/UL (ref 4.4–11.3)

## 2025-04-01 PROCEDURE — 81025 URINE PREGNANCY TEST: CPT | Performed by: PHYSICIAN ASSISTANT

## 2025-04-01 PROCEDURE — 2550000001 HC RX 255 CONTRASTS: Performed by: EMERGENCY MEDICINE

## 2025-04-01 PROCEDURE — 93005 ELECTROCARDIOGRAM TRACING: CPT

## 2025-04-01 PROCEDURE — 81003 URINALYSIS AUTO W/O SCOPE: CPT | Performed by: EMERGENCY MEDICINE

## 2025-04-01 PROCEDURE — 99285 EMERGENCY DEPT VISIT HI MDM: CPT | Mod: 25 | Performed by: EMERGENCY MEDICINE

## 2025-04-01 PROCEDURE — 84484 ASSAY OF TROPONIN QUANT: CPT | Performed by: PHYSICIAN ASSISTANT

## 2025-04-01 PROCEDURE — 74177 CT ABD & PELVIS W/CONTRAST: CPT | Performed by: RADIOLOGY

## 2025-04-01 PROCEDURE — 99285 EMERGENCY DEPT VISIT HI MDM: CPT | Performed by: PHYSICIAN ASSISTANT

## 2025-04-01 PROCEDURE — 96374 THER/PROPH/DIAG INJ IV PUSH: CPT | Mod: 59

## 2025-04-01 PROCEDURE — 74177 CT ABD & PELVIS W/CONTRAST: CPT

## 2025-04-01 PROCEDURE — 83690 ASSAY OF LIPASE: CPT | Performed by: EMERGENCY MEDICINE

## 2025-04-01 PROCEDURE — 80053 COMPREHEN METABOLIC PANEL: CPT | Performed by: EMERGENCY MEDICINE

## 2025-04-01 PROCEDURE — 96361 HYDRATE IV INFUSION ADD-ON: CPT

## 2025-04-01 PROCEDURE — 85025 COMPLETE CBC W/AUTO DIFF WBC: CPT | Performed by: EMERGENCY MEDICINE

## 2025-04-01 PROCEDURE — 2500000004 HC RX 250 GENERAL PHARMACY W/ HCPCS (ALT 636 FOR OP/ED): Performed by: PHYSICIAN ASSISTANT

## 2025-04-01 PROCEDURE — 36415 COLL VENOUS BLD VENIPUNCTURE: CPT | Performed by: EMERGENCY MEDICINE

## 2025-04-01 RX ORDER — ONDANSETRON HYDROCHLORIDE 2 MG/ML
4 INJECTION, SOLUTION INTRAVENOUS ONCE
Status: COMPLETED | OUTPATIENT
Start: 2025-04-01 | End: 2025-04-01

## 2025-04-01 RX ORDER — ONDANSETRON 4 MG/1
4 TABLET, ORALLY DISINTEGRATING ORAL EVERY 8 HOURS PRN
Qty: 12 TABLET | Refills: 0 | Status: SHIPPED | OUTPATIENT
Start: 2025-04-01 | End: 2025-04-08

## 2025-04-01 RX ADMIN — IOHEXOL 75 ML: 350 INJECTION, SOLUTION INTRAVENOUS at 22:51

## 2025-04-01 RX ADMIN — ONDANSETRON 4 MG: 2 INJECTION INTRAMUSCULAR; INTRAVENOUS at 20:16

## 2025-04-01 RX ADMIN — SODIUM CHLORIDE 1000 ML: 9 INJECTION, SOLUTION INTRAVENOUS at 20:14

## 2025-04-01 ASSESSMENT — PAIN DESCRIPTION - PAIN TYPE: TYPE: ACUTE PAIN

## 2025-04-01 ASSESSMENT — LIFESTYLE VARIABLES
EVER HAD A DRINK FIRST THING IN THE MORNING TO STEADY YOUR NERVES TO GET RID OF A HANGOVER: NO
HAVE PEOPLE ANNOYED YOU BY CRITICIZING YOUR DRINKING: NO
HAVE YOU EVER FELT YOU SHOULD CUT DOWN ON YOUR DRINKING: NO
TOTAL SCORE: 0
EVER FELT BAD OR GUILTY ABOUT YOUR DRINKING: NO

## 2025-04-01 ASSESSMENT — PAIN DESCRIPTION - DIRECTION: RADIATING_TOWARDS: LOWER

## 2025-04-01 ASSESSMENT — COLUMBIA-SUICIDE SEVERITY RATING SCALE - C-SSRS
1. IN THE PAST MONTH, HAVE YOU WISHED YOU WERE DEAD OR WISHED YOU COULD GO TO SLEEP AND NOT WAKE UP?: NO
6. HAVE YOU EVER DONE ANYTHING, STARTED TO DO ANYTHING, OR PREPARED TO DO ANYTHING TO END YOUR LIFE?: NO
2. HAVE YOU ACTUALLY HAD ANY THOUGHTS OF KILLING YOURSELF?: NO

## 2025-04-01 ASSESSMENT — PAIN SCALES - GENERAL: PAINLEVEL_OUTOF10: 5 - MODERATE PAIN

## 2025-04-01 ASSESSMENT — PAIN - FUNCTIONAL ASSESSMENT: PAIN_FUNCTIONAL_ASSESSMENT: 0-10

## 2025-04-01 ASSESSMENT — PAIN DESCRIPTION - ORIENTATION: ORIENTATION: RIGHT

## 2025-04-01 ASSESSMENT — PAIN DESCRIPTION - LOCATION: LOCATION: ABDOMEN

## 2025-04-02 LAB — HOLD SPECIMEN: NORMAL

## 2025-04-02 NOTE — DISCHARGE INSTRUCTIONS
Follow-up with your primary care physician.  Seek immediate medical attention with any worsening symptoms, chest pain, shortness of breath, vomiting, diarrhea or for any reason

## 2025-04-02 NOTE — ED PROVIDER NOTES
Emergency Department Provider Note        History of Present Illness     History provided by: Patient  Limitations to History: None  External Records Reviewed with Brief Summary: None    HPI:  Tracy Iglesias is a 44 y.o. female who is previously healthy who presents today for evaluation of 1 month of right upper quadrant abdominal pain that became more severe today, patient states that the abdominal pain is pretty constant, at times gets worse but states that there is no real pattern to it, denies it being postprandial or after greasy fatty foods.  Patient states that today she had a very significant episode of diffuse generalized abdominal pain that she states has now subsided and now she is just left with a chronic right upper quadrant abdominal pain that she has had for the last month.  She did have an episode of nausea accompanied by 1 episode of nonbloody vomiting, she also had about 4 episodes of diarrhea in the last 24 hours but states that this is not unusual and is chronic.  States that she never has fully formed bowel movements, states that they are always either soft or diarrheal in nature.  Denies any blood.  Denies any fevers or chills.  Denies any urinary frequency burning urgency.    Physical Exam   Triage vitals:  T 35.7 °C (96.3 °F)  HR 88  /67  RR 17  O2 99 % None (Room air)    Physical Exam  Vitals and nursing note reviewed.   Constitutional:       General: She is not in acute distress.     Appearance: Normal appearance. She is not toxic-appearing.   HENT:      Head: Normocephalic and atraumatic.      Nose: Nose normal.   Eyes:      Extraocular Movements: Extraocular movements intact.   Cardiovascular:      Rate and Rhythm: Normal rate and regular rhythm.   Pulmonary:      Effort: Pulmonary effort is normal.   Abdominal:      Palpations: Abdomen is soft.      Tenderness: There is abdominal tenderness in the right upper quadrant. There is right CVA tenderness. Positive signs include  Neuro Sandhu's sign. Negative signs include Rovsing's sign, McBurney's sign, psoas sign and obturator sign.   Musculoskeletal:         General: Normal range of motion.      Cervical back: Normal range of motion and neck supple.   Skin:     General: Skin is warm and dry.   Neurological:      General: No focal deficit present.      Mental Status: She is alert.   Psychiatric:         Mood and Affect: Mood normal.         Thought Content: Thought content normal.        CT abdomen pelvis w IV contrast    (Results Pending)     Labs Reviewed   URINALYSIS WITH REFLEX CULTURE AND MICROSCOPIC - Abnormal       Result Value    Color, Urine Colorless (*)     Appearance, Urine Clear      Specific Gravity, Urine 1.003 (*)     pH, Urine 5.5      Protein, Urine NEGATIVE      Glucose, Urine Normal      Blood, Urine NEGATIVE      Ketones, Urine NEGATIVE      Bilirubin, Urine NEGATIVE      Urobilinogen, Urine Normal      Nitrite, Urine NEGATIVE      Leukocyte Esterase, Urine NEGATIVE     HCG, URINE, QUALITATIVE - Normal    HCG, Urine NEGATIVE     CBC WITH AUTO DIFFERENTIAL    WBC 9.5      nRBC 0.0      RBC 4.66      Hemoglobin 13.8      Hematocrit 42.7      MCV 92      MCH 29.6      MCHC 32.3      RDW 13.2      Platelets 290      Neutrophils % 72.0      Immature Granulocytes %, Automated 0.2      Lymphocytes % 20.8      Monocytes % 6.1      Eosinophils % 0.6      Basophils % 0.3      Neutrophils Absolute 6.85      Immature Granulocytes Absolute, Automated 0.02      Lymphocytes Absolute 1.98      Monocytes Absolute 0.58      Eosinophils Absolute 0.06      Basophils Absolute 0.03     COMPREHENSIVE METABOLIC PANEL   LIPASE   URINALYSIS WITH REFLEX CULTURE AND MICROSCOPIC    Narrative:     The following orders were created for panel order Urinalysis with Reflex Culture and Microscopic.  Procedure                               Abnormality         Status                     ---------                               -----------         ------                      Urinalysis with Reflex C...[[id number]]  Abnormal            Final result               Extra Urine Gray Tube[[id number]]                            In process                   Please view results for these tests on the individual orders.   EXTRA URINE GRAY TUBE   TROPONIN I, HIGH SENSITIVITY     ED Course as of 25   Tue  EKG: Sinus rhythm at 74 bpm.  .  QRS 90.  QTc 463.  Normal axis.  No ST elevations, no acute ischemic pattern [PS]    Patient was signed out to me by Guerda Torres PA-C. Patient is a 44-year-old female is coming in with month 1 month of right upper quadrant abdominal pain then today had a more severe episode affecting her entire abdomen that is since resolved and now just localized again to the right upper quadrant and had 1 episode of nonbloody vomiting and also has frequent diarrhea has had 4 episodes in the last 24 hours but that is not unusual for her pretty benign abdominal exam little bit of tenderness in the right upper quadrant with a positive Sandhu sign getting a CT if CT is negative she can go possible biliary colic but no concerns for acute acute cholecystitis given current labs and duration of symptoms.  Dispo is pending CT scan.  Plan is to discharge if CT is negative for any critical findings. [CO]   2341 CT imaging returns with no acute intra-abdominal pathology.  Discussed these findings with the patient, patient will be discharged.  Recommended to follow-up with primary care physician.  Patient is given return precautions.  The patient understands and agrees with discharge plan. [PS]      ED Course User Index  [CO] Linda Stokes MD  [PS] Flavio Jordan DO         Diagnoses as of 25   Right upper quadrant abdominal pain   Nausea         Medical Decision Making & ED Course   Medical Decision Makin y.o. female presents today for evaluation of right upper quadrant abdominal pain that she has had for the  last month accompanied by 1 more severe episode of generalized abdominal pain that has now subsided.  She does have tenderness in the right upper quadrant with a positive Sandhu sign, no fevers chills, she is currently nauseated.  We do not have ultrasound capabilities at this point in the night however I did order a CT abdomen pelvis as well as labs including EKG and troponin given upper abdominal pain.  She was given Zofran and fluids.  Workup pending my departure from the department, patient will be signed out to incoming resident, staffed with Dr. Jordan.  ----      Differential diagnoses considered include but are not limited to: Biliary colic, acute cholecystitis, ascending cholangitis, appendicitis, kidney stone, pyelonephritis, etc.     Social Determinants of Health which Significantly Impact Care: None identified     EKG Independent Interpretation: EKG interpreted by myself. Please see ED Course for full interpretation.    Independent Result Review and Interpretation: Relevant laboratory and radiographic results were reviewed and independently interpreted by myself.  As necessary, they are commented on in the ED Course.    Chronic conditions affecting the patient's care: As documented above in [facility]    The patient was discussed with the following consultants/services: None    Care Considerations: As documented above in [facility]    ED Course:  ED Course as of 04/02/25 0013   Tue Apr 01, 2025 2027 EKG: Sinus rhythm at 74 bpm.  .  QRS 90.  QTc 463.  Normal axis.  No ST elevations, no acute ischemic pattern [PS]   2102 Patient was signed out to me by Guerda Torres PA-C. Patient is a 44-year-old female is coming in with month 1 month of right upper quadrant abdominal pain then today had a more severe episode affecting her entire abdomen that is since resolved and now just localized again to the right upper quadrant and had 1 episode of nonbloody vomiting and also has frequent diarrhea has had 4 episodes in  the last 24 hours but that is not unusual for her pretty benign abdominal exam little bit of tenderness in the right upper quadrant with a positive Sandhu sign getting a CT if CT is negative she can go possible biliary colic but no concerns for acute acute cholecystitis given current labs and duration of symptoms.  Dispo is pending CT scan.  Plan is to discharge if CT is negative for any critical findings. [CO]   2341 CT imaging returns with no acute intra-abdominal pathology.  Discussed these findings with the patient, patient will be discharged.  Recommended to follow-up with primary care physician.  Patient is given return precautions.  The patient understands and agrees with discharge plan. [PS]      ED Course User Index  [CO] Linda Stokes MD  [PS] Flavio Jordan DO         Diagnoses as of 04/02/25 0013   Right upper quadrant abdominal pain   Nausea     Disposition   Patient was signed out to Dr Stokes at 9pm pending completion of their work-up.  Please see the next provider's transition of care note for the remainder of the patient's care.     Procedures   Procedures    This was a shared visit with an ED attending.  The patient was seen and discussed with the ED attending    Guerda Torres PA-C  Emergency Medicine       Guerda Torres PA-C  04/01/25 2017       Guerda Torres PA-C  04/01/25 2032        This patient was seen by the advanced practice provider.  I have personally performed a substantive portion of the encounter.  I have seen and examined the patient; agree with the workup, evaluation, MDM, management and diagnosis.  The care plan has been discussed.      I personally saw the patient and made/approved the management plan and take responsibility for the patient management.                                                    Flavio Jordan DO  04/02/25 0013

## 2025-04-04 LAB
ATRIAL RATE: 74 BPM
P AXIS: 95 DEGREES
P OFFSET: 193 MS
P ONSET: 146 MS
PR INTERVAL: 142 MS
Q ONSET: 217 MS
QRS COUNT: 12 BEATS
QRS DURATION: 90 MS
QT INTERVAL: 418 MS
QTC CALCULATION(BAZETT): 463 MS
QTC FREDERICIA: 448 MS
R AXIS: 64 DEGREES
T AXIS: 65 DEGREES
T OFFSET: 426 MS
VENTRICULAR RATE: 74 BPM

## 2025-04-05 DIAGNOSIS — E78.5 HYPERLIPIDEMIA, UNSPECIFIED: ICD-10-CM

## 2025-04-07 RX ORDER — ATORVASTATIN CALCIUM 10 MG/1
10 TABLET, FILM COATED ORAL DAILY
Qty: 90 TABLET | Refills: 0 | Status: SHIPPED | OUTPATIENT
Start: 2025-04-07

## 2025-06-06 DIAGNOSIS — R10.11 RIGHT UPPER QUADRANT PAIN: ICD-10-CM

## 2025-06-06 RX ORDER — OMEPRAZOLE 40 MG/1
40 CAPSULE, DELAYED RELEASE ORAL DAILY
Qty: 90 CAPSULE | Refills: 1 | Status: SHIPPED | OUTPATIENT
Start: 2025-06-06

## 2025-07-15 ENCOUNTER — PATIENT MESSAGE (OUTPATIENT)
Dept: PRIMARY CARE | Facility: CLINIC | Age: 45
End: 2025-07-15
Payer: COMMERCIAL

## 2025-07-15 DIAGNOSIS — E78.5 HYPERLIPIDEMIA, UNSPECIFIED: ICD-10-CM

## 2025-07-15 RX ORDER — ATORVASTATIN CALCIUM 10 MG/1
10 TABLET, FILM COATED ORAL DAILY
Qty: 90 TABLET | Refills: 0 | Status: SHIPPED | OUTPATIENT
Start: 2025-07-15

## 2025-09-16 ENCOUNTER — APPOINTMENT (OUTPATIENT)
Dept: PRIMARY CARE | Facility: CLINIC | Age: 45
End: 2025-09-16
Payer: COMMERCIAL